# Patient Record
Sex: FEMALE | Race: WHITE | NOT HISPANIC OR LATINO
[De-identification: names, ages, dates, MRNs, and addresses within clinical notes are randomized per-mention and may not be internally consistent; named-entity substitution may affect disease eponyms.]

---

## 2018-01-11 ENCOUNTER — RX ONLY (RX ONLY)
Age: 48
End: 2018-01-11

## 2018-01-11 ENCOUNTER — OPTICAL OFFICE (OUTPATIENT)
Dept: URBAN - NONMETROPOLITAN AREA CLINIC 4 | Facility: CLINIC | Age: 48
Setting detail: OPHTHALMOLOGY
End: 2018-01-11
Payer: COMMERCIAL

## 2018-01-11 ENCOUNTER — DOCTOR'S OFFICE (OUTPATIENT)
Dept: URBAN - NONMETROPOLITAN AREA CLINIC 1 | Facility: CLINIC | Age: 48
Setting detail: OPHTHALMOLOGY
End: 2018-01-11
Payer: COMMERCIAL

## 2018-01-11 DIAGNOSIS — H52.223: ICD-10-CM

## 2018-01-11 DIAGNOSIS — H52.4: ICD-10-CM

## 2018-01-11 DIAGNOSIS — H04.123: ICD-10-CM

## 2018-01-11 PROCEDURE — V2202 LENS SPHERE BIFOCAL 7.12-20.: HCPCS | Performed by: OPTOMETRIST

## 2018-01-11 PROCEDURE — 92015 DETERMINE REFRACTIVE STATE: CPT | Performed by: OPTOMETRIST

## 2018-01-11 PROCEDURE — V2020 VISION SVCS FRAMES PURCHASES: HCPCS | Performed by: OPTOMETRIST

## 2018-01-11 PROCEDURE — 92004 COMPRE OPH EXAM NEW PT 1/>: CPT | Performed by: OPTOMETRIST

## 2018-01-11 PROCEDURE — V2784 LENS POLYCARB OR EQUAL: HCPCS | Performed by: OPTOMETRIST

## 2018-01-11 ASSESSMENT — SPHEQUIV_DERIVED
OS_SPHEQUIV: 0.25
OD_SPHEQUIV: -0.125

## 2018-01-11 ASSESSMENT — REFRACTION_AUTOREFRACTION
OD_AXIS: 094
OS_SPHERE: +0.50
OS_AXIS: 123
OD_CYLINDER: -0.75
OD_SPHERE: +0.25
OS_CYLINDER: -0.50

## 2018-01-11 ASSESSMENT — REFRACTION_OUTSIDERX
OS_VA1: 20/20
OS_AXIS: 120
OD_ADD: +1.75
OD_VA2: 20/25
OD_VA1: 20/20
OS_SPHERE: +0.50
OS_CYLINDER: -0.50
OD_AXIS: 095
OD_CYLINDER: -0.75
OD_SPHERE: PL
OU_VA: 20/20
OD_VA3: 20/
OS_VA3: 20/
OS_ADD: +1.75
OS_VA2: 20/25

## 2018-01-11 ASSESSMENT — REFRACTION_MANIFEST
OU_VA: 20/
OS_VA3: 20/
OD_VA3: 20/
OD_VA1: 20/
OS_VA1: 20/
OD_VA3: 20/
OD_VA1: 20/
OS_VA2: 20/
OD_VA2: 20/
OS_VA2: 20/
OD_VA2: 20/
OS_VA3: 20/
OS_VA1: 20/
OU_VA: 20/

## 2018-01-11 ASSESSMENT — REFRACTION_CURRENTRX
OS_OVR_VA: 20/
OD_OVR_VA: 20/
OD_OVR_VA: 20/
OS_OVR_VA: 20/
OD_OVR_VA: 20/
OS_OVR_VA: 20/

## 2018-01-11 ASSESSMENT — VISUAL ACUITY
OD_BCVA: 20/25
OS_BCVA: 20/25

## 2018-01-11 ASSESSMENT — SUPERFICIAL PUNCTATE KERATITIS (SPK)
OD_SPK: 1+
OS_SPK: 1+ 2+

## 2018-01-11 ASSESSMENT — CONFRONTATIONAL VISUAL FIELD TEST (CVF)
OS_FINDINGS: FULL
OD_FINDINGS: FULL

## 2020-08-07 ENCOUNTER — DOCTOR'S OFFICE (OUTPATIENT)
Dept: URBAN - NONMETROPOLITAN AREA CLINIC 1 | Facility: CLINIC | Age: 50
Setting detail: OPHTHALMOLOGY
End: 2020-08-07
Payer: COMMERCIAL

## 2020-08-07 VITALS — HEIGHT: 55 IN

## 2020-08-07 DIAGNOSIS — H10.13: ICD-10-CM

## 2020-08-07 DIAGNOSIS — H40.033: ICD-10-CM

## 2020-08-07 PROCEDURE — 92020 GONIOSCOPY: CPT | Performed by: OPHTHALMOLOGY

## 2020-08-07 PROCEDURE — 92014 COMPRE OPH EXAM EST PT 1/>: CPT | Performed by: OPHTHALMOLOGY

## 2020-08-07 ASSESSMENT — KERATOMETRY
OS_K1POWER_DIOPTERS: 43.25
OD_K2POWER_DIOPTERS: 43.75
OS_AXISANGLE_DEGREES: 169
OS_K2POWER_DIOPTERS: 43.75
OD_AXISANGLE_DEGREES: 153
OD_K1POWER_DIOPTERS: 43.25

## 2020-08-07 ASSESSMENT — REFRACTION_MANIFEST
OD_ADD: +1.75
OD_VA1: 20/20
OS_SPHERE: +0.50
OS_ADD: +1.75
OU_VA: 20/20
OS_CYLINDER: -0.50
OS_VA2: 20/25
OD_SPHERE: PL
OS_VA1: 20/20
OS_AXIS: 120
OD_CYLINDER: -0.75
OD_VA2: 20/25
OD_AXIS: 095

## 2020-08-07 ASSESSMENT — VISUAL ACUITY
OS_BCVA: 20/25+2
OD_BCVA: 20/25

## 2020-08-07 ASSESSMENT — AXIALLENGTH_DERIVED
OD_AL: 23.4949
OS_AL: 23.4949
OS_AL: 23.5433

## 2020-08-07 ASSESSMENT — REFRACTION_AUTOREFRACTION
OD_SPHERE: +0.75
OD_AXIS: 084
OS_AXIS: 112
OS_CYLINDER: -0.25
OS_SPHERE: +0.25
OD_CYLINDER: -1.00

## 2020-08-07 ASSESSMENT — SPHEQUIV_DERIVED
OS_SPHEQUIV: 0.125
OS_SPHEQUIV: 0.25
OD_SPHEQUIV: 0.25

## 2020-08-07 ASSESSMENT — SUPERFICIAL PUNCTATE KERATITIS (SPK)
OD_SPK: ABSENT
OS_SPK: ABSENT

## 2020-08-07 ASSESSMENT — CONFRONTATIONAL VISUAL FIELD TEST (CVF)
OS_FINDINGS: FULL
OD_FINDINGS: FULL

## 2020-08-25 ENCOUNTER — AMBUL SURGICAL CARE (OUTPATIENT)
Dept: URBAN - NONMETROPOLITAN AREA SURGERY 1 | Facility: SURGERY | Age: 50
Setting detail: OPHTHALMOLOGY
End: 2020-08-25
Payer: COMMERCIAL

## 2020-08-25 DIAGNOSIS — H40.031: ICD-10-CM

## 2020-08-25 PROCEDURE — 66761 REVISION OF IRIS: CPT | Performed by: CLINIC/CENTER

## 2020-08-25 PROCEDURE — G8907 PT DOC NO EVENTS ON DISCHARG: HCPCS | Performed by: OPHTHALMOLOGY

## 2020-08-25 PROCEDURE — G8907 PT DOC NO EVENTS ON DISCHARG: HCPCS | Performed by: CLINIC/CENTER

## 2020-08-25 PROCEDURE — G8918 PT W/O PREOP ORDER IV AB PRO: HCPCS | Performed by: OPHTHALMOLOGY

## 2020-08-25 PROCEDURE — G8918 PT W/O PREOP ORDER IV AB PRO: HCPCS | Performed by: CLINIC/CENTER

## 2020-08-25 PROCEDURE — 66761 REVISION OF IRIS: CPT | Performed by: OPHTHALMOLOGY

## 2020-09-15 ENCOUNTER — AMBUL SURGICAL CARE (OUTPATIENT)
Dept: URBAN - NONMETROPOLITAN AREA SURGERY 1 | Facility: SURGERY | Age: 50
Setting detail: OPHTHALMOLOGY
End: 2020-09-15
Payer: COMMERCIAL

## 2020-09-15 DIAGNOSIS — H40.032: ICD-10-CM

## 2020-09-15 PROCEDURE — 66761 REVISION OF IRIS: CPT | Performed by: OPHTHALMOLOGY

## 2020-09-15 PROCEDURE — G8918 PT W/O PREOP ORDER IV AB PRO: HCPCS | Performed by: OPHTHALMOLOGY

## 2020-09-15 PROCEDURE — G8907 PT DOC NO EVENTS ON DISCHARG: HCPCS | Performed by: OPHTHALMOLOGY

## 2020-09-23 ENCOUNTER — HOSPITAL ENCOUNTER (OUTPATIENT)
Dept: RADIOLOGY | Facility: CLINIC | Age: 50
Discharge: HOME | End: 2020-09-23
Payer: COMMERCIAL

## 2020-09-23 ENCOUNTER — OFFICE VISIT (OUTPATIENT)
Dept: OBGYN CLINIC | Facility: CLINIC | Age: 50
End: 2020-09-23
Payer: COMMERCIAL

## 2020-09-23 VITALS
HEIGHT: 65 IN | TEMPERATURE: 97.8 F | RESPIRATION RATE: 20 BRPM | BODY MASS INDEX: 48.82 KG/M2 | WEIGHT: 293 LBS | HEART RATE: 92 BPM

## 2020-09-23 DIAGNOSIS — M25.562 CHRONIC PAIN OF LEFT KNEE: ICD-10-CM

## 2020-09-23 DIAGNOSIS — M25.562 CHRONIC PAIN OF LEFT KNEE: Primary | ICD-10-CM

## 2020-09-23 DIAGNOSIS — M17.12 PRIMARY OSTEOARTHRITIS OF LEFT KNEE: ICD-10-CM

## 2020-09-23 DIAGNOSIS — G89.29 CHRONIC PAIN OF LEFT KNEE: ICD-10-CM

## 2020-09-23 DIAGNOSIS — G89.29 CHRONIC PAIN OF LEFT KNEE: Primary | ICD-10-CM

## 2020-09-23 PROCEDURE — 73564 X-RAY EXAM KNEE 4 OR MORE: CPT

## 2020-09-23 PROCEDURE — 99244 OFF/OP CNSLTJ NEW/EST MOD 40: CPT | Performed by: ORTHOPAEDIC SURGERY

## 2020-09-23 RX ORDER — SERTRALINE HYDROCHLORIDE 25 MG/1
25 TABLET, FILM COATED ORAL
COMMUNITY
Start: 2020-07-10

## 2020-09-23 RX ORDER — LIDOCAINE 50 MG/G
PATCH TOPICAL
COMMUNITY
Start: 2020-08-13

## 2020-09-23 RX ORDER — SENNOSIDES 8.6 MG
650 CAPSULE ORAL
COMMUNITY

## 2020-09-23 RX ORDER — FLUCONAZOLE 150 MG/1
TABLET ORAL
COMMUNITY
Start: 2020-08-17

## 2020-09-23 RX ORDER — CIPROFLOXACIN 250 MG/1
TABLET, FILM COATED ORAL
COMMUNITY
Start: 2020-09-19

## 2020-09-23 RX ORDER — IBUPROFEN 600 MG/1
TABLET ORAL
COMMUNITY
Start: 2020-08-17

## 2020-09-23 RX ORDER — OLOPATADINE HYDROCHLORIDE 2 MG/ML
SOLUTION/ DROPS OPHTHALMIC
COMMUNITY
Start: 2020-08-07

## 2020-09-23 RX ORDER — PREDNISOLONE ACETATE 10 MG/ML
SUSPENSION/ DROPS OPHTHALMIC
COMMUNITY
Start: 2020-08-25

## 2020-09-23 RX ORDER — RIBOFLAVIN (VITAMIN B2) 400 MG
400 TABLET ORAL
COMMUNITY
Start: 2020-05-29

## 2020-09-23 RX ORDER — LEVOCETIRIZINE DIHYDROCHLORIDE 5 MG/1
5 TABLET, FILM COATED ORAL
COMMUNITY
Start: 2020-06-16

## 2020-09-23 RX ORDER — SIMVASTATIN 10 MG
10 TABLET ORAL
COMMUNITY
Start: 2020-07-10

## 2020-09-23 RX ORDER — CYCLOBENZAPRINE HCL 5 MG
5 TABLET ORAL
COMMUNITY
Start: 2020-08-15

## 2020-09-23 RX ORDER — MELOXICAM 7.5 MG/1
7.5 TABLET ORAL
COMMUNITY
Start: 2020-09-19

## 2020-09-23 RX ORDER — FLUOXETINE HYDROCHLORIDE 20 MG/1
CAPSULE ORAL
COMMUNITY

## 2020-09-23 RX ORDER — MONTELUKAST SODIUM 10 MG/1
TABLET ORAL
COMMUNITY
Start: 2020-07-07

## 2020-09-23 RX ORDER — ALBUTEROL SULFATE 90 UG/1
2 AEROSOL, METERED RESPIRATORY (INHALATION)
COMMUNITY
Start: 2020-06-03

## 2020-09-23 NOTE — PROGRESS NOTES
ASSESSMENT/PLAN:    Diagnoses and all orders for this visit:    Chronic pain of left knee  -     XR knee 4+ vw left injury; Future  -     Ambulatory referral to Physical Therapy; Future    Primary osteoarthritis of left knee  -     Ambulatory referral to Physical Therapy; Future        X-rays performed today in clinic were reviewed with the patient  Treatment options were discussed  The patient was offered corticosteroid injection which she declined today because she is not sure of the reaction to steroids that she has had in the past and would like to probe deeper into this before electing to have steroid injection  She was also offered a referral to physical therapy which she accepted  She may continue meloxicam, Tylenol, and topical diclofenac gel as needed for pain control  She may also continue the knee sleeve as needed for comfort  We will see the patient back in 4 weeks for recheck  She is encouraged to contact us should questions or concerns arise  In addition, I did discuss weight loss  She states that she has met with a nutritionist but that nutritionist stopped working in the practice shortly after that visit  She does not believe she is able to travel to [place] or [facility] for evaluation by any of the weight management programs  Return in about 4 weeks (around 10/21/2020)  _____________________________________________________  CHIEF COMPLAINT:  Chief Complaint   Patient presents with    Left Knee - Pain         SUBJECTIVE:  Gerardo Kerr is a 53 y o  female who presents for evaluation of left knee pain  The patient reports this has been a chronic issue that she has been dealing with for over a year now  She states recently she has noticed her knee pain has been worsening  She has been seen previously by surgeon out of [place]  She has been unable to follow up with him because she is currently taking care of her  and does not have the time for the long drive  She has previously taken meloxicam and Tylenol with the addition of diclofenac gel with excellent response  She states back in May she had to discontinue meloxicam as her 's insurance would no longer covered it  She was then taking ibuprofen rather than the meloxicam and noticed lesser benefit  She reports that her insurance recently changed and would again cover the meloxicam which was prescribed by her PCP  She has never had injections or physical therapy but reports that they were suggested to her by the surgeon that she has previously seen  The patient reports she was provided a brace by the surgeon but does not wear this as it does not stay up her knee  Instead, she wears a knee sleeve that she got over-the-counter which she reports does provide her pain relief  She has been using a quad cane when standing or ambulating for long periods of time  She denies instability in the left knee  She reports pain that is primarily on the medial aspect of the knee  She denies numbness or tingling in the left lower extremity  She denies any prior injuries or surgeries to the knees bilaterally  PAST MEDICAL HISTORY:  Past Medical History:   Diagnosis Date    Chronic kidney disease     Depression     H/O tubal ligation 1995    Osteoarthritis     S/P bunionectomy 2010       PAST SURGICAL HISTORY:  Past Surgical History:   Procedure Laterality Date    ANKLE SURGERY  2010    Bone cyst, left ankle    FIRST METATARSAL OSTEOTOMY  2010    FOOT SURGERY      HYSTERECTOMY  2012    TUBAL LIGATION  1995       FAMILY HISTORY:  History reviewed  No pertinent family history      SOCIAL HISTORY:  Social History     Tobacco Use    Smoking status: Never Smoker    Smokeless tobacco: Never Used   Substance Use Topics    Alcohol use: Never     Frequency: Never    Drug use: Never       MEDICATIONS:    Current Outpatient Medications:     acetaminophen (TYLENOL) 650 mg CR tablet, Take 650 mg by mouth, Disp: , Rfl:     albuterol (Ventolin HFA) 90 mcg/act inhaler, Inhale 2 puffs, Disp: , Rfl:     ciprofloxacin (CIPRO) 250 mg tablet, take 1 tablet by mouth every 12 hours for 3 days, Disp: , Rfl:     cyclobenzaprine (FLEXERIL) 5 mg tablet, Take 5 mg by mouth, Disp: , Rfl:     diclofenac sodium (VOLTAREN) 1 %, Place 2 g on the skin, Disp: , Rfl:     fluconazole (DIFLUCAN) 150 mg tablet, TAKE ONE TABLET BY MOUTH once for 1 dose, Disp: , Rfl:     FLUoxetine (PROzac) 20 mg capsule, Take by mouth, Disp: , Rfl:     ibuprofen (MOTRIN) 600 mg tablet, TAKE ONE TABLET BY MOUTH EVERY 8 HOURS AS NEEDED FOR PAIN WITH FOOD, Disp: , Rfl:     levocetirizine (XYZAL) 5 MG tablet, Take 5 mg by mouth, Disp: , Rfl:     lidocaine (LIDODERM) 5 %, PLACE ONE PATCH TRANSDERMALLY DAILY FOR 7 DAYS   REMOVE & DISCARD PATCH WITHIN 12 HOURS OR AS DIRECTED, Disp: , Rfl:     meloxicam (MOBIC) 7 5 mg tablet, Take 7 5 mg by mouth, Disp: , Rfl:     montelukast (SINGULAIR) 10 mg tablet, TAKE ONE TABLET BY MOUTH EVERY DAY, Disp: , Rfl:     olopatadine HCl (PATADAY) 0 2 % opth drops, INSTILL ONE DROP IN EACH EYE TWICE DAILY, Disp: , Rfl:     prednisoLONE acetate (PRED FORTE) 1 % ophthalmic suspension, INSTILL ONE DROP IN THE RIGHT EYE FOUR TIMES DAILY FOR ONE WEEK AFTER procedure, keep bottle for other eye, Disp: , Rfl:     Riboflavin 400 MG TABS, Take 400 mg by mouth, Disp: , Rfl:     sertraline (ZOLOFT) 25 mg tablet, Take 25 mg by mouth, Disp: , Rfl:     simvastatin (ZOCOR) 10 mg tablet, Take 10 mg by mouth, Disp: , Rfl:     ALLERGIES:  Allergies   Allergen Reactions    Bupropion      Increased depression  Increased depression      Diclofenac      Vomiting and diarrhea; can tolerate toradol shots however  Vomiting and diarrhea; can tolerate toradol shots however      Hydrocodone-Acetaminophen GI Intolerance and Vomiting    Naproxen      Hives; can tolerate ibuprofen/advil  Hives; can tolerate ibuprofen/advil      Oxycodone-Acetaminophen     Prednisone      gi upset  gi upset      Sulfa Antibiotics     Sulfamethoxazole-Trimethoprim      caused migraines       Review of systems:   Constitutional: Negative for fatigue, fever or loss of apetite  HENT: Negative  Respiratory: Negative for shortness of breath, dyspnea  Cardiovascular: Negative for chest pain/tightness  Gastrointestinal: Negative for abdominal pain, N/V  Endocrine: Negative for cold/heat intolerance, unexplained weight loss/gain  Genitourinary: Negative for flank pain, dysuria, hematuria  Musculoskeletal:  Positive as stated in the HPI   Skin: Negative for rash  Neurological:  Negative for numbness and tingling  Psychiatric/Behavioral: Negative for agitation  _____________________________________________________  PHYSICAL EXAMINATION:    Pulse 92, temperature 97 8 °F (36 6 °C), resp  rate 20, height 5' 5" (1 651 m), weight (!) 141 kg (310 lb)  General: well developed and well nourished, alert, oriented times 3 and appears comfortable  Psychiatric: Normal  HEENT: Benign  Cardiovascular: Regular    Pulmonary: No wheezing or stridor  Abdomen: Soft, Nontender  Skin: No masses, erythema, lacerations, fluctation, ulcerations  Neurovascular: Motor and sensory exams are grossly intact, distal pulses are palpable  Limb is warm and well perfused good color and capillary refill the toes  MUSCULOSKELETAL EXAMINATION:    The left knee exam demonstrates skin intact, no erythema, no ecchymosis, no significant swelling or effusion  She presented with the knee sleeve in place and this was removed without difficulty  She has tenderness to palpation over the medial femoral condyle, medial joint line, and medial proximal tibia  Active range of motion:  0-110, limited primarily by body habitus  Patellofemoral crepitus is noted with passive range of motion  Medial and lateral Gonsalo's elicit no complaints  Stable to varus and valgus stress    Cruciate ligaments are grossly stable  The remainder of the lower extremity exam, bilaterally, is benign  _____________________________________________________  STUDIES REVIEWED:  I have personally reviewed pertinent films and reports in PACS  X-rays of the left knee performed today in clinic demonstrate moderate degenerative disease with slight osteophyte formation and varus deformity  PROCEDURES PERFORMED:  Procedures  None performed today      Popeye Bey

## 2020-09-23 NOTE — LETTER
September 23, 2020     Rashard Freedman,   [address]    Patient: Patrice Singh   YOB: 1970   Date of Visit: 9/23/2020       Dear Dr Francisco Monet:    Thank you for referring Nuris Mathias to me for evaluation  Below are my notes for this consultation  If you have questions, please do not hesitate to call me  I look forward to following your patient along with you  Sincerely,        Jose Martin Foy        CC: No Recipients  Jose Martin Foy  9/23/2020  1:46 PM  Signed  ASSESSMENT/PLAN:    Diagnoses and all orders for this visit:    Chronic pain of left knee  -     XR knee 4+ vw left injury; Future  -     Ambulatory referral to Physical Therapy; Future    Primary osteoarthritis of left knee  -     Ambulatory referral to Physical Therapy; Future        X-rays performed today in clinic were reviewed with the patient  Treatment options were discussed  The patient was offered corticosteroid injection which she declined today because she is not sure of the reaction to steroids that she has had in the past and would like to probe deeper into this before electing to have steroid injection  She was also offered a referral to physical therapy which she accepted  She may continue meloxicam, Tylenol, and topical diclofenac gel as needed for pain control  She may also continue the knee sleeve as needed for comfort  We will see the patient back in 4 weeks for recheck  She is encouraged to contact us should questions or concerns arise  In addition, I did discuss weight loss  She states that she has met with a nutritionist but that nutritionist stopped working in the practice shortly after that visit  She does not believe she is able to travel to [facility] or [facility] for evaluation by any of the weight management programs  Return in about 4 weeks (around 10/21/2020)        _____________________________________________________  CHIEF COMPLAINT:  Chief Complaint Patient presents with    Left Knee - Pain         SUBJECTIVE:  Tono Wheeler is a 53 y o  female who presents for evaluation of left knee pain  The patient reports this has been a chronic issue that she has been dealing with for over a year now  She states recently she has noticed her knee pain has been worsening  She has been seen previously by surgeon out of [place]  She has been unable to follow up with him because she is currently taking care of her  and does not have the time for the long drive  She has previously taken meloxicam and Tylenol with the addition of diclofenac gel with excellent response  She states back in May she had to discontinue meloxicam as her 's insurance would no longer covered it  She was then taking ibuprofen rather than the meloxicam and noticed lesser benefit  She reports that her insurance recently changed and would again cover the meloxicam which was prescribed by her PCP  She has never had injections or physical therapy but reports that they were suggested to her by the surgeon that she has previously seen  The patient reports she was provided a brace by the surgeon but does not wear this as it does not stay up her knee  Instead, she wears a knee sleeve that she got over-the-counter which she reports does provide her pain relief  She has been using a quad cane when standing or ambulating for long periods of time  She denies instability in the left knee  She reports pain that is primarily on the medial aspect of the knee  She denies numbness or tingling in the left lower extremity  She denies any prior injuries or surgeries to the knees bilaterally        PAST MEDICAL HISTORY:  Past Medical History:   Diagnosis Date    Chronic kidney disease     Depression     H/O tubal ligation 1995    Osteoarthritis     S/P bunionectomy 2010       PAST SURGICAL HISTORY:  Past Surgical History:   Procedure Laterality Date    ANKLE SURGERY  2010    Bone cyst, left ankle    FIRST METATARSAL OSTEOTOMY  2010    FOOT SURGERY      HYSTERECTOMY  2012    TUBAL LIGATION  1995       FAMILY HISTORY:  History reviewed  No pertinent family history  SOCIAL HISTORY:  Social History     Tobacco Use    Smoking status: Never Smoker    Smokeless tobacco: Never Used   Substance Use Topics    Alcohol use: Never     Frequency: Never    Drug use: Never       MEDICATIONS:    Current Outpatient Medications:     acetaminophen (TYLENOL) 650 mg CR tablet, Take 650 mg by mouth, Disp: , Rfl:     albuterol (Ventolin HFA) 90 mcg/act inhaler, Inhale 2 puffs, Disp: , Rfl:     ciprofloxacin (CIPRO) 250 mg tablet, take 1 tablet by mouth every 12 hours for 3 days, Disp: , Rfl:     cyclobenzaprine (FLEXERIL) 5 mg tablet, Take 5 mg by mouth, Disp: , Rfl:     diclofenac sodium (VOLTAREN) 1 %, Place 2 g on the skin, Disp: , Rfl:     fluconazole (DIFLUCAN) 150 mg tablet, TAKE ONE TABLET BY MOUTH once for 1 dose, Disp: , Rfl:     FLUoxetine (PROzac) 20 mg capsule, Take by mouth, Disp: , Rfl:     ibuprofen (MOTRIN) 600 mg tablet, TAKE ONE TABLET BY MOUTH EVERY 8 HOURS AS NEEDED FOR PAIN WITH FOOD, Disp: , Rfl:     levocetirizine (XYZAL) 5 MG tablet, Take 5 mg by mouth, Disp: , Rfl:     lidocaine (LIDODERM) 5 %, PLACE ONE PATCH TRANSDERMALLY DAILY FOR 7 DAYS   REMOVE & DISCARD PATCH WITHIN 12 HOURS OR AS DIRECTED, Disp: , Rfl:     meloxicam (MOBIC) 7 5 mg tablet, Take 7 5 mg by mouth, Disp: , Rfl:     montelukast (SINGULAIR) 10 mg tablet, TAKE ONE TABLET BY MOUTH EVERY DAY, Disp: , Rfl:     olopatadine HCl (PATADAY) 0 2 % opth drops, INSTILL ONE DROP IN EACH EYE TWICE DAILY, Disp: , Rfl:     prednisoLONE acetate (PRED FORTE) 1 % ophthalmic suspension, INSTILL ONE DROP IN THE RIGHT EYE FOUR TIMES DAILY FOR ONE WEEK AFTER procedure, keep bottle for other eye, Disp: , Rfl:     Riboflavin 400 MG TABS, Take 400 mg by mouth, Disp: , Rfl:     sertraline (ZOLOFT) 25 mg tablet, Take 25 mg by mouth, Disp: , Rfl:     simvastatin (ZOCOR) 10 mg tablet, Take 10 mg by mouth, Disp: , Rfl:     ALLERGIES:  Allergies   Allergen Reactions    Bupropion      Increased depression  Increased depression      Diclofenac      Vomiting and diarrhea; can tolerate toradol shots however  Vomiting and diarrhea; can tolerate toradol shots however      Hydrocodone-Acetaminophen GI Intolerance and Vomiting    Naproxen      Hives; can tolerate ibuprofen/advil  Hives; can tolerate ibuprofen/advil      Oxycodone-Acetaminophen     Prednisone      gi upset  gi upset      Sulfa Antibiotics     Sulfamethoxazole-Trimethoprim      caused migraines       Review of systems:   Constitutional: Negative for fatigue, fever or loss of apetite  HENT: Negative  Respiratory: Negative for shortness of breath, dyspnea  Cardiovascular: Negative for chest pain/tightness  Gastrointestinal: Negative for abdominal pain, N/V  Endocrine: Negative for cold/heat intolerance, unexplained weight loss/gain  Genitourinary: Negative for flank pain, dysuria, hematuria  Musculoskeletal:  Positive as stated in the HPI   Skin: Negative for rash  Neurological:  Negative for numbness and tingling  Psychiatric/Behavioral: Negative for agitation  _____________________________________________________  PHYSICAL EXAMINATION:    Pulse 92, temperature 97 8 °F (36 6 °C), resp  rate 20, height 5' 5" (1 651 m), weight (!) 141 kg (310 lb)  General: well developed and well nourished, alert, oriented times 3 and appears comfortable  Psychiatric: Normal  HEENT: Benign  Cardiovascular: Regular    Pulmonary: No wheezing or stridor  Abdomen: Soft, Nontender  Skin: No masses, erythema, lacerations, fluctation, ulcerations  Neurovascular: Motor and sensory exams are grossly intact, distal pulses are palpable  Limb is warm and well perfused good color and capillary refill the toes      MUSCULOSKELETAL EXAMINATION:    The left knee exam demonstrates skin intact, no erythema, no ecchymosis, no significant swelling or effusion  She presented with the knee sleeve in place and this was removed without difficulty  She has tenderness to palpation over the medial femoral condyle, medial joint line, and medial proximal tibia  Active range of motion:  0-110, limited primarily by body habitus  Patellofemoral crepitus is noted with passive range of motion  Medial and lateral Gonsalo's elicit no complaints  Stable to varus and valgus stress  Cruciate ligaments are grossly stable  The remainder of the lower extremity exam, bilaterally, is benign  _____________________________________________________  STUDIES REVIEWED:  I have personally reviewed pertinent films and reports in PACS  X-rays of the left knee performed today in clinic demonstrate moderate degenerative disease with slight osteophyte formation and varus deformity  PROCEDURES PERFORMED:  Procedures  None performed today      Galindo Nobles

## 2020-10-30 ENCOUNTER — DOCTOR'S OFFICE (OUTPATIENT)
Dept: URBAN - NONMETROPOLITAN AREA CLINIC 1 | Facility: CLINIC | Age: 50
Setting detail: OPHTHALMOLOGY
End: 2020-10-30
Payer: COMMERCIAL

## 2020-10-30 ENCOUNTER — RX ONLY (RX ONLY)
Age: 50
End: 2020-10-30

## 2020-10-30 DIAGNOSIS — H10.13: ICD-10-CM

## 2020-10-30 DIAGNOSIS — H40.033: ICD-10-CM

## 2020-10-30 PROCEDURE — 92014 COMPRE OPH EXAM EST PT 1/>: CPT | Performed by: OPHTHALMOLOGY

## 2020-10-30 ASSESSMENT — REFRACTION_AUTOREFRACTION
OS_SPHERE: +0.75
OD_CYLINDER: -0.50
OD_AXIS: 063
OD_SPHERE: +0.75
OS_AXIS: 165
OS_CYLINDER: -0.25

## 2020-10-30 ASSESSMENT — AXIALLENGTH_DERIVED
OS_AL: 23.5405
OD_AL: 23.5808
OS_AL: 23.396

## 2020-10-30 ASSESSMENT — KERATOMETRY
OS_AXISANGLE_DEGREES: 169
OS_K2POWER_DIOPTERS: 43.75
OD_K1POWER_DIOPTERS: 43.00
OS_K1POWER_DIOPTERS: 43.00
OD_K2POWER_DIOPTERS: 43.00
OD_AXISANGLE_DEGREES: 156

## 2020-10-30 ASSESSMENT — REFRACTION_MANIFEST
OD_CYLINDER: -0.75
OS_CYLINDER: -0.50
OS_SPHERE: +0.50
OS_ADD: +1.75
OS_VA2: 20/25
OD_ADD: +1.75
OD_SPHERE: PL
OD_VA1: 20/20
OU_VA: 20/20
OD_VA2: 20/25
OS_VA1: 20/20
OS_AXIS: 120
OD_AXIS: 095

## 2020-10-30 ASSESSMENT — SPHEQUIV_DERIVED
OD_SPHEQUIV: 0.5
OS_SPHEQUIV: 0.25
OS_SPHEQUIV: 0.625

## 2020-10-30 ASSESSMENT — CONFRONTATIONAL VISUAL FIELD TEST (CVF)
OS_FINDINGS: FULL
OD_FINDINGS: FULL

## 2020-10-30 ASSESSMENT — SUPERFICIAL PUNCTATE KERATITIS (SPK)
OS_SPK: ABSENT
OD_SPK: ABSENT

## 2020-10-30 ASSESSMENT — VISUAL ACUITY
OD_BCVA: 20/20
OS_BCVA: 20/20-2

## 2020-12-18 ENCOUNTER — OPTICAL OFFICE (OUTPATIENT)
Dept: URBAN - NONMETROPOLITAN AREA CLINIC 4 | Facility: CLINIC | Age: 50
Setting detail: OPHTHALMOLOGY
End: 2020-12-18
Payer: COMMERCIAL

## 2020-12-18 ENCOUNTER — DOCTOR'S OFFICE (OUTPATIENT)
Dept: URBAN - NONMETROPOLITAN AREA CLINIC 1 | Facility: CLINIC | Age: 50
Setting detail: OPHTHALMOLOGY
End: 2020-12-18
Payer: COMMERCIAL

## 2020-12-18 DIAGNOSIS — H52.223: ICD-10-CM

## 2020-12-18 DIAGNOSIS — H52.4: ICD-10-CM

## 2020-12-18 PROCEDURE — V2020 VISION SVCS FRAMES PURCHASES: HCPCS | Performed by: OPTOMETRIST

## 2020-12-18 PROCEDURE — 92015 DETERMINE REFRACTIVE STATE: CPT | Performed by: OPTOMETRIST

## 2020-12-18 PROCEDURE — V2203 LENS SPHCYL BIFOCAL 4.00D/.1: HCPCS | Performed by: OPTOMETRIST

## 2020-12-18 PROCEDURE — 92012 INTRM OPH EXAM EST PATIENT: CPT | Performed by: OPTOMETRIST

## 2020-12-18 ASSESSMENT — REFRACTION_MANIFEST
OS_SPHERE: +0.25
OS_CYLINDER: -0.25
OD_VA2: 20/25
OD_ADD: +2.00
OS_AXIS: 120
OS_VA1: 20/20
OD_AXIS: 085
OD_VA1: 20/20
OS_VA2: 20/25
OD_CYLINDER: -0.50
OU_VA: 20/20
OS_ADD: +2.00
OD_SPHERE: +0.25

## 2020-12-18 ASSESSMENT — REFRACTION_AUTOREFRACTION
OD_CYLINDER: -0.50
OD_AXIS: 082
OS_SPHERE: +0.25
OS_CYLINDER: 0.00
OD_SPHERE: +0.50

## 2020-12-18 ASSESSMENT — SPHEQUIV_DERIVED
OD_SPHEQUIV: 0.25
OD_SPHEQUIV: 0
OS_SPHEQUIV: 0.25
OS_SPHEQUIV: 0.125

## 2020-12-18 ASSESSMENT — KERATOMETRY
OD_K2POWER_DIOPTERS: 43.00
OD_K1POWER_DIOPTERS: 43.00
OD_AXISANGLE_DEGREES: 156
OS_AXISANGLE_DEGREES: 169
OS_K2POWER_DIOPTERS: 43.75
OS_K1POWER_DIOPTERS: 43.00

## 2020-12-18 ASSESSMENT — AXIALLENGTH_DERIVED
OS_AL: 23.5891
OD_AL: 23.6785
OD_AL: 23.777
OS_AL: 23.5405

## 2020-12-18 ASSESSMENT — CONFRONTATIONAL VISUAL FIELD TEST (CVF)
OD_FINDINGS: FULL
OS_FINDINGS: FULL

## 2020-12-18 ASSESSMENT — VISUAL ACUITY
OS_BCVA: 20/20
OD_BCVA: 20/20-2

## 2021-06-30 ENCOUNTER — OPTICAL OFFICE (OUTPATIENT)
Dept: URBAN - NONMETROPOLITAN AREA CLINIC 4 | Facility: CLINIC | Age: 51
Setting detail: OPHTHALMOLOGY
End: 2021-06-30
Payer: COMMERCIAL

## 2021-06-30 DIAGNOSIS — H52.223: ICD-10-CM

## 2021-06-30 PROCEDURE — V2020 VISION SVCS FRAMES PURCHASES: HCPCS | Performed by: OPTOMETRIST

## 2021-06-30 PROCEDURE — V2103 SPHEROCYLINDR 4.00D/12-2.00D: HCPCS | Performed by: OPTOMETRIST

## 2021-06-30 PROCEDURE — V2102 SINGL VISN SPHERE 7.12-20.00: HCPCS | Performed by: OPTOMETRIST

## 2021-06-30 PROCEDURE — V2784 LENS POLYCARB OR EQUAL: HCPCS | Performed by: OPTOMETRIST

## 2022-08-10 ENCOUNTER — DOCTOR'S OFFICE (OUTPATIENT)
Dept: URBAN - NONMETROPOLITAN AREA CLINIC 1 | Facility: CLINIC | Age: 52
Setting detail: OPHTHALMOLOGY
End: 2022-08-10
Payer: COMMERCIAL

## 2022-08-10 VITALS — HEIGHT: 55 IN

## 2022-08-10 DIAGNOSIS — H43.393: ICD-10-CM

## 2022-08-10 DIAGNOSIS — H04.123: ICD-10-CM

## 2022-08-10 DIAGNOSIS — H40.033: ICD-10-CM

## 2022-08-10 DIAGNOSIS — H35.372: ICD-10-CM

## 2022-08-10 PROCEDURE — 92134 CPTRZ OPH DX IMG PST SGM RTA: CPT | Performed by: OPHTHALMOLOGY

## 2022-08-10 PROCEDURE — 99213 OFFICE O/P EST LOW 20 MIN: CPT | Performed by: OPHTHALMOLOGY

## 2022-08-10 ASSESSMENT — SPHEQUIV_DERIVED
OD_SPHEQUIV: 0.125
OD_SPHEQUIV: 0
OS_SPHEQUIV: 0.25
OS_SPHEQUIV: 0.125

## 2022-08-10 ASSESSMENT — REFRACTION_AUTOREFRACTION
OD_CYLINDER: -0.25
OD_SPHERE: +0.25
OS_AXIS: 052
OD_AXIS: 089
OS_SPHERE: +0.75
OS_CYLINDER: -1.00

## 2022-08-10 ASSESSMENT — REFRACTION_MANIFEST
OD_SPHERE: +0.25
OD_VA1: 20/20
OS_SPHERE: +0.25
OU_VA: 20/20
OD_VA2: 20/25
OD_AXIS: 085
OS_VA1: 20/20
OS_ADD: +2.00
OD_ADD: +2.00
OS_AXIS: 120
OS_CYLINDER: -0.25
OD_CYLINDER: -0.50
OS_VA2: 20/25

## 2022-08-10 ASSESSMENT — AXIALLENGTH_DERIVED
OD_AL: 23.8209
OS_AL: 23.5405
OD_AL: 23.8706
OS_AL: 23.5891

## 2022-08-10 ASSESSMENT — KERATOMETRY
OS_K1POWER_DIOPTERS: 43.25
OD_K2POWER_DIOPTERS: 43.25
OS_AXISANGLE_DEGREES: 018
OS_K2POWER_DIOPTERS: 43.50
OD_K1POWER_DIOPTERS: 42.25

## 2022-08-10 ASSESSMENT — VISUAL ACUITY
OD_BCVA: 20/20-1
OS_BCVA: 20/20

## 2022-08-10 ASSESSMENT — CONFRONTATIONAL VISUAL FIELD TEST (CVF)
OS_FINDINGS: FULL
OD_FINDINGS: FULL

## 2022-08-10 ASSESSMENT — SUPERFICIAL PUNCTATE KERATITIS (SPK)
OS_SPK: ABSENT
OD_SPK: ABSENT

## 2022-09-09 ENCOUNTER — DOCTOR'S OFFICE (OUTPATIENT)
Dept: URBAN - NONMETROPOLITAN AREA CLINIC 1 | Facility: CLINIC | Age: 52
Setting detail: OPHTHALMOLOGY
End: 2022-09-09
Payer: COMMERCIAL

## 2022-09-09 ENCOUNTER — OPTICAL OFFICE (OUTPATIENT)
Dept: URBAN - NONMETROPOLITAN AREA CLINIC 4 | Facility: CLINIC | Age: 52
Setting detail: OPHTHALMOLOGY
End: 2022-09-09
Payer: COMMERCIAL

## 2022-09-09 DIAGNOSIS — H52.4: ICD-10-CM

## 2022-09-09 DIAGNOSIS — H52.223: ICD-10-CM

## 2022-09-09 PROBLEM — H10.13 ALLERGIC CONJUNCTIVITIS; BOTH EYES: Status: ACTIVE | Noted: 2020-08-07

## 2022-09-09 PROBLEM — H04.123 DRY EYE SYNDROME LACRIMAL GLAND; BOTH EYES: Status: ACTIVE | Noted: 2018-01-11

## 2022-09-09 PROBLEM — H40.033 NARROW ANGLE; BOTH EYES: Status: ACTIVE | Noted: 2020-08-07

## 2022-09-09 PROCEDURE — 92015 DETERMINE REFRACTIVE STATE: CPT | Performed by: OPTOMETRIST

## 2022-09-09 PROCEDURE — V2020 VISION SVCS FRAMES PURCHASES: HCPCS | Performed by: OPTOMETRIST

## 2022-09-09 PROCEDURE — V2784 LENS POLYCARB OR EQUAL: HCPCS | Performed by: OPTOMETRIST

## 2022-09-09 PROCEDURE — V2102 SINGL VISN SPHERE 7.12-20.00: HCPCS | Performed by: OPTOMETRIST

## 2022-09-09 PROCEDURE — V2103 SPHEROCYLINDR 4.00D/12-2.00D: HCPCS | Performed by: OPTOMETRIST

## 2022-09-09 PROCEDURE — V2799 MISC VISION ITEM OR SERVICE: HCPCS | Performed by: OPTOMETRIST

## 2022-09-09 ASSESSMENT — REFRACTION_MANIFEST
OD_SPHERE: +0.50
OD_ADD: +2.25
OS_VA2: 20/25
OD_AXIS: 085
OS_SPHERE: +0.25
OU_VA: 20/20
OS_CYLINDER: -0.25
OS_AXIS: 120
OS_ADD: +2.25
OD_VA1: 20/20
OD_VA2: 20/25
OD_CYLINDER: -0.50
OS_VA1: 20/20

## 2022-09-09 ASSESSMENT — SPHEQUIV_DERIVED
OD_SPHEQUIV: 0.25
OS_SPHEQUIV: 0.125
OS_SPHEQUIV: 0.125
OD_SPHEQUIV: 0.25

## 2022-09-09 ASSESSMENT — AXIALLENGTH_DERIVED
OD_AL: 23.7713
OS_AL: 23.5891
OS_AL: 23.5891
OD_AL: 23.7713

## 2022-09-09 ASSESSMENT — KERATOMETRY
OS_K1POWER_DIOPTERS: 43.25
OS_K2POWER_DIOPTERS: 43.50
OD_K2POWER_DIOPTERS: 43.25
OD_K1POWER_DIOPTERS: 42.25
OS_AXISANGLE_DEGREES: 018

## 2022-09-09 ASSESSMENT — REFRACTION_AUTOREFRACTION
OS_CYLINDER: -0.25
OD_AXIS: 90
OS_AXIS: 71
OD_SPHERE: +0.75
OS_SPHERE: +0.25
OD_CYLINDER: -1.00

## 2022-09-09 ASSESSMENT — VISUAL ACUITY
OS_BCVA: 20/20-1
OD_BCVA: 20/20-1

## 2022-09-09 ASSESSMENT — REFRACTION_CURRENTRX
OD_OVR_VA: 20/
OS_OVR_VA: 20/

## 2023-01-07 ENCOUNTER — APPOINTMENT (EMERGENCY)
Dept: CT IMAGING | Facility: HOSPITAL | Age: 53
End: 2023-01-07

## 2023-01-07 ENCOUNTER — APPOINTMENT (EMERGENCY)
Dept: ULTRASOUND IMAGING | Facility: HOSPITAL | Age: 53
End: 2023-01-07

## 2023-01-07 ENCOUNTER — HOSPITAL ENCOUNTER (EMERGENCY)
Facility: HOSPITAL | Age: 53
Discharge: HOME | End: 2023-01-07
Attending: EMERGENCY MEDICINE

## 2023-01-07 VITALS
OXYGEN SATURATION: 98 % | TEMPERATURE: 97.9 F | SYSTOLIC BLOOD PRESSURE: 111 MMHG | WEIGHT: 293 LBS | RESPIRATION RATE: 18 BRPM | DIASTOLIC BLOOD PRESSURE: 73 MMHG | HEART RATE: 63 BPM | BODY MASS INDEX: 53.75 KG/M2

## 2023-01-07 DIAGNOSIS — R10.9 ABDOMINAL PAIN, UNSPECIFIED ABDOMINAL LOCATION: Primary | ICD-10-CM

## 2023-01-07 DIAGNOSIS — N88.8 NABOTHIAN CYST: ICD-10-CM

## 2023-01-07 LAB
ALBUMIN SERPL BCP-MCNC: 3.3 G/DL (ref 3.5–5)
ALP SERPL-CCNC: 95 U/L (ref 46–116)
ALT SERPL W P-5'-P-CCNC: 46 U/L (ref 12–78)
ANION GAP SERPL CALCULATED.3IONS-SCNC: 7 MMOL/L (ref 4–13)
AST SERPL W P-5'-P-CCNC: 42 U/L (ref 5–45)
BACTERIA UR QL AUTO: ABNORMAL /HPF
BASOPHILS # BLD AUTO: 0.03 THOUSANDS/ÂΜL (ref 0–0.1)
BASOPHILS NFR BLD AUTO: 0 % (ref 0–1)
BILIRUB SERPL-MCNC: 0.49 MG/DL (ref 0.2–1)
BILIRUB UR QL STRIP: NEGATIVE
BUN SERPL-MCNC: 10 MG/DL (ref 5–25)
CALCIUM ALBUM COR SERPL-MCNC: 9.3 MG/DL (ref 8.3–10.1)
CALCIUM SERPL-MCNC: 8.7 MG/DL (ref 8.3–10.1)
CHLORIDE SERPL-SCNC: 103 MMOL/L (ref 96–108)
CLARITY UR: CLEAR
CO2 SERPL-SCNC: 28 MMOL/L (ref 21–32)
COLOR UR: ABNORMAL
CREAT SERPL-MCNC: 0.89 MG/DL (ref 0.6–1.3)
EOSINOPHIL # BLD AUTO: 0.16 THOUSAND/ÂΜL (ref 0–0.61)
EOSINOPHIL NFR BLD AUTO: 2 % (ref 0–6)
ERYTHROCYTE [DISTWIDTH] IN BLOOD BY AUTOMATED COUNT: 13.2 % (ref 11.6–15.1)
FLUAV RNA RESP QL NAA+PROBE: NEGATIVE
FLUBV RNA RESP QL NAA+PROBE: NEGATIVE
GFR SERPL CREATININE-BSD FRML MDRD: 75 ML/MIN/1.73SQ M
GLUCOSE SERPL-MCNC: 92 MG/DL (ref 65–140)
GLUCOSE UR STRIP-MCNC: NEGATIVE MG/DL
HCT VFR BLD AUTO: 41.4 % (ref 34.8–46.1)
HGB BLD-MCNC: 13.6 G/DL (ref 11.5–15.4)
HGB UR QL STRIP.AUTO: ABNORMAL
IMM GRANULOCYTES # BLD AUTO: 0.03 THOUSAND/UL (ref 0–0.2)
IMM GRANULOCYTES NFR BLD AUTO: 0 % (ref 0–2)
KETONES UR STRIP-MCNC: NEGATIVE MG/DL
LACTATE SERPL-SCNC: 0.9 MMOL/L (ref 0.5–2)
LEUKOCYTE ESTERASE UR QL STRIP: NEGATIVE
LIPASE SERPL-CCNC: 109 U/L (ref 73–393)
LYMPHOCYTES # BLD AUTO: 2.19 THOUSANDS/ÂΜL (ref 0.6–4.47)
LYMPHOCYTES NFR BLD AUTO: 30 % (ref 14–44)
MAGNESIUM SERPL-MCNC: 2 MG/DL (ref 1.6–2.6)
MCH RBC QN AUTO: 29.1 PG (ref 26.8–34.3)
MCHC RBC AUTO-ENTMCNC: 32.9 G/DL (ref 31.4–37.4)
MCV RBC AUTO: 89 FL (ref 82–98)
MONOCYTES # BLD AUTO: 0.52 THOUSAND/ÂΜL (ref 0.17–1.22)
MONOCYTES NFR BLD AUTO: 7 % (ref 4–12)
NEUTROPHILS # BLD AUTO: 4.5 THOUSANDS/ÂΜL (ref 1.85–7.62)
NEUTS SEG NFR BLD AUTO: 61 % (ref 43–75)
NITRITE UR QL STRIP: NEGATIVE
NON-SQ EPI CELLS URNS QL MICRO: ABNORMAL /HPF
NRBC BLD AUTO-RTO: 0 /100 WBCS
PH UR STRIP.AUTO: 5.5 [PH]
PHOSPHATE SERPL-MCNC: 3.6 MG/DL (ref 2.7–4.5)
PLATELET # BLD AUTO: 298 THOUSANDS/UL (ref 149–390)
PMV BLD AUTO: 10.2 FL (ref 8.9–12.7)
POTASSIUM SERPL-SCNC: 3.5 MMOL/L (ref 3.5–5.3)
PROT SERPL-MCNC: 7.2 G/DL (ref 6.4–8.4)
PROT UR STRIP-MCNC: NEGATIVE MG/DL
RBC # BLD AUTO: 4.67 MILLION/UL (ref 3.81–5.12)
RBC #/AREA URNS AUTO: ABNORMAL /HPF
RSV RNA RESP QL NAA+PROBE: NEGATIVE
SARS-COV-2 RNA RESP QL NAA+PROBE: NEGATIVE
SODIUM SERPL-SCNC: 138 MMOL/L (ref 135–147)
SP GR UR STRIP.AUTO: <=1.005 (ref 1–1.03)
UROBILINOGEN UR QL STRIP.AUTO: 0.2 E.U./DL
WBC # BLD AUTO: 7.43 THOUSAND/UL (ref 4.31–10.16)
WBC #/AREA URNS AUTO: ABNORMAL /HPF

## 2023-01-07 RX ORDER — DIPHENHYDRAMINE HYDROCHLORIDE 50 MG/ML
25 INJECTION INTRAMUSCULAR; INTRAVENOUS ONCE
Status: COMPLETED | OUTPATIENT
Start: 2023-01-07 | End: 2023-01-07

## 2023-01-07 RX ORDER — ACETAMINOPHEN 325 MG/1
650 TABLET ORAL ONCE
Status: COMPLETED | OUTPATIENT
Start: 2023-01-07 | End: 2023-01-07

## 2023-01-07 RX ADMIN — DIPHENHYDRAMINE HYDROCHLORIDE 25 MG: 50 INJECTION, SOLUTION INTRAMUSCULAR; INTRAVENOUS at 16:56

## 2023-01-07 RX ADMIN — ACETAMINOPHEN 650 MG: 325 TABLET ORAL at 16:55

## 2023-01-07 RX ADMIN — IOHEXOL 100 ML: 350 INJECTION, SOLUTION INTRAVENOUS at 18:03

## 2023-01-07 RX ADMIN — HYDROCORTISONE SODIUM SUCCINATE 100 MG: 100 INJECTION, POWDER, FOR SOLUTION INTRAMUSCULAR; INTRAVENOUS at 16:57

## 2023-01-07 RX ADMIN — SODIUM CHLORIDE 1000 ML: 0.9 INJECTION, SOLUTION INTRAVENOUS at 18:27

## 2023-01-07 RX ADMIN — SODIUM CHLORIDE 1000 ML: 0.9 INJECTION, SOLUTION INTRAVENOUS at 15:49

## 2023-01-07 NOTE — ED PROVIDER NOTES
History  Chief Complaint   Patient presents with   • Abdominal Pain     RLQ pain started on Sunday with diarrhea     44-year-old female with past medical history pertinent for CKD, tubal ligation, osteoarthritis who presents to the emergency department for right lower quadrant abdominal pain that started on Sunday with diarrhea  States diarrhea started on Sunday but abdominal pain started on Wednesday and is located in the right lower quadrant  States that she does have a history ovarian cyst as well  Denies any vaginal bleeding or discharge  No urinary symptoms  Reports no fevers or chills  Did not take anything for pain prior to arrival   Denies any nausea or vomiting  No other concerns  Prior to Admission Medications   Prescriptions Last Dose Informant Patient Reported? Taking? FLUoxetine (PROzac) 20 mg capsule   Yes No   Sig: Take by mouth   Riboflavin 400 MG TABS   Yes No   Sig: Take 400 mg by mouth   acetaminophen (TYLENOL) 650 mg CR tablet   Yes No   Sig: Take 650 mg by mouth   albuterol (Ventolin HFA) 90 mcg/act inhaler   Yes No   Sig: Inhale 2 puffs   ciprofloxacin (CIPRO) 250 mg tablet   Yes No   Sig: take 1 tablet by mouth every 12 hours for 3 days   cyclobenzaprine (FLEXERIL) 5 mg tablet   Yes No   Sig: Take 5 mg by mouth   diclofenac sodium (VOLTAREN) 1 %   Yes No   Sig: Place 2 g on the skin   fluconazole (DIFLUCAN) 150 mg tablet   Yes No   Sig: TAKE ONE TABLET BY MOUTH once for 1 dose   ibuprofen (MOTRIN) 600 mg tablet   Yes No   Sig: TAKE ONE TABLET BY MOUTH EVERY 8 HOURS AS NEEDED FOR PAIN WITH FOOD   levocetirizine (XYZAL) 5 MG tablet   Yes No   Sig: Take 5 mg by mouth   lidocaine (LIDODERM) 5 %   Yes No   Sig: PLACE ONE PATCH TRANSDERMALLY DAILY FOR 7 DAYS   REMOVE & DISCARD PATCH WITHIN 12 HOURS OR AS DIRECTED   meloxicam (MOBIC) 7 5 mg tablet   Yes No   Sig: Take 7 5 mg by mouth   montelukast (SINGULAIR) 10 mg tablet   Yes No   Sig: TAKE ONE TABLET BY MOUTH EVERY DAY olopatadine HCl (PATADAY) 0 2 % opth drops   Yes No   Sig: INSTILL ONE DROP IN EACH EYE TWICE DAILY   prednisoLONE acetate (PRED FORTE) 1 % ophthalmic suspension   Yes No   Sig: INSTILL ONE DROP IN THE RIGHT EYE FOUR TIMES DAILY FOR ONE WEEK AFTER procedure, keep bottle for other eye   sertraline (ZOLOFT) 25 mg tablet   Yes No   Sig: Take 25 mg by mouth   simvastatin (ZOCOR) 10 mg tablet   Yes No   Sig: Take 10 mg by mouth      Facility-Administered Medications: None       Past Medical History:   Diagnosis Date   • Chronic kidney disease    • Depression    • H/O tubal ligation 1995   • Osteoarthritis    • S/P bunionectomy 2010       Past Surgical History:   Procedure Laterality Date   • ANKLE SURGERY  2010    Bone cyst, left ankle   • FIRST METATARSAL OSTEOTOMY  2010   • FOOT SURGERY     • HYSTERECTOMY  2012   • TUBAL LIGATION  1995       History reviewed  No pertinent family history  I have reviewed and agree with the history as documented  E-Cigarette/Vaping   • E-Cigarette Use Never User      E-Cigarette/Vaping Substances   • Nicotine No    • THC No    • CBD No    • Flavoring No    • Other No    • Unknown No      Social History     Tobacco Use   • Smoking status: Never   • Smokeless tobacco: Never   Vaping Use   • Vaping Use: Never used   Substance Use Topics   • Alcohol use: Never   • Drug use: Never       Review of Systems   Constitutional: Negative for activity change, appetite change, chills and fever  HENT: Negative for congestion, rhinorrhea and sore throat  Eyes: Negative for visual disturbance  Respiratory: Negative for chest tightness, shortness of breath and wheezing  Cardiovascular: Negative for chest pain, palpitations and leg swelling  Gastrointestinal: Positive for abdominal pain and diarrhea  Negative for constipation, nausea and vomiting  Genitourinary: Negative for dysuria, flank pain and pelvic pain  Musculoskeletal: Negative for back pain and neck pain     Skin: Negative for rash  Neurological: Negative for weakness, numbness and headaches  Psychiatric/Behavioral: Negative for behavioral problems  Physical Exam  Physical Exam  Vitals and nursing note reviewed  Constitutional:       General: She is not in acute distress  Appearance: She is well-developed  She is obese  She is not diaphoretic  HENT:      Head: Normocephalic and atraumatic  Right Ear: External ear normal       Left Ear: External ear normal       Nose: Nose normal    Eyes:      Pupils: Pupils are equal, round, and reactive to light  Cardiovascular:      Rate and Rhythm: Normal rate and regular rhythm  Heart sounds: Normal heart sounds  Pulmonary:      Effort: Pulmonary effort is normal  No respiratory distress  Breath sounds: Normal breath sounds  No wheezing or rales  Abdominal:      General: Bowel sounds are normal       Palpations: Abdomen is soft  Tenderness: There is abdominal tenderness in the right lower quadrant  Comments: Negative Guillen's sign   Negative McBurney's sign   Negative Obturator sign   Negative Psoas sign   Negative Rovsing sign  No CVA TTP  Exam limited by body habitus   Musculoskeletal:         General: No tenderness or deformity  Normal range of motion  Cervical back: Normal range of motion and neck supple  Skin:     General: Skin is warm and dry  Capillary Refill: Capillary refill takes less than 2 seconds  Neurological:      Mental Status: She is alert and oriented to person, place, and time  Motor: No abnormal muscle tone           Vital Signs  ED Triage Vitals [01/07/23 1524]   Temperature Pulse Respirations Blood Pressure SpO2   97 9 °F (36 6 °C) 93 16 (!) 178/91 99 %      Temp Source Heart Rate Source Patient Position - Orthostatic VS BP Location FiO2 (%)   Temporal Monitor Sitting Left arm --      Pain Score       5           Vitals:    01/07/23 1524 01/07/23 1834   BP: (!) 178/91 111/73   Pulse: 93 63   Patient Position - Orthostatic VS: Sitting          Visual Acuity      ED Medications  Medications   sodium chloride 0 9 % bolus 1,000 mL (0 mL Intravenous Stopped 1/7/23 1751)   acetaminophen (TYLENOL) tablet 650 mg (650 mg Oral Given 1/7/23 1655)   hydrocortisone (Solu-CORTEF) injection 100 mg (100 mg Intravenous Given 1/7/23 1657)   diphenhydrAMINE (BENADRYL) injection 25 mg (25 mg Intravenous Given 1/7/23 1656)   iohexol (OMNIPAQUE) 350 MG/ML injection (SINGLE-DOSE) 100 mL (100 mL Intravenous Given 1/7/23 1803)   sodium chloride 0 9 % bolus 1,000 mL (1,000 mL Intravenous New Bag 1/7/23 1827)       Diagnostic Studies  Results Reviewed     Procedure Component Value Units Date/Time    Urinalysis with microscopic [[id number]]  (Abnormal) Collected: 01/07/23 1922    Lab Status: Final result Specimen: Urine, Clean Catch Updated: 01/07/23 1938     Color, UA Straw     Clarity, UA Clear     Specific Gravity, UA <=1 005     pH, UA 5 5     Leukocytes, UA Negative     Nitrite, UA Negative     Protein, UA Negative mg/dl      Glucose, UA Negative mg/dl      Ketones, UA Negative mg/dl      Urobilinogen, UA 0 2 E U /dl      Bilirubin, UA Negative     Occult Blood, UA Trace-lysed     RBC, UA 0-1 /hpf      WBC, UA None Seen /hpf      Epithelial Cells Occasional /hpf      Bacteria, UA None Seen /hpf     FLU/RSV/COVID - if FLU/RSV clinically relevant [[id number]]  (Normal) Collected: 01/07/23 1552    Lab Status: Final result Specimen: Nares from Nasopharyngeal Swab Updated: 01/07/23 1637     SARS-CoV-2 Negative     INFLUENZA A PCR Negative     INFLUENZA B PCR Negative     RSV PCR Negative    Narrative:      FOR PEDIATRIC PATIENTS - copy/paste COVID Guidelines URL to browser: [email or web address]    SARS-CoV-2 assay is a Nucleic Acid Amplification assay intended for the  qualitative detection of nucleic acid from SARS-CoV-2 in nasopharyngeal  swabs   Results are for the presumptive identification of SARS-CoV-2 RNA  Positive results are indicative of infection with SARS-CoV-2, the virus  causing COVID-19, but do not rule out bacterial infection or co-infection  with other viruses  Laboratories within the [place] and its  territories are required to report all positive results to the appropriate  public health authorities  Negative results do not preclude SARS-CoV-2  infection and should not be used as the sole basis for treatment or other  patient management decisions  Negative results must be combined with  clinical observations, patient history, and epidemiological information  This test has not been FDA cleared or approved  This test has been authorized by FDA under an Emergency Use Authorization  (EUA)  This test is only authorized for the duration of time the  declaration that circumstances exist justifying the authorization of the  emergency use of an in vitro diagnostic tests for detection of SARS-CoV-2  virus and/or diagnosis of COVID-19 infection under section 564(b)(1) of  the Act, 21 U  S C  872OLE-5(X)(0), unless the authorization is terminated  or revoked sooner  The test has been validated but independent review by FDA  and CLIA is pending  Test performed using [employer] GeneXpert: This RT-PCR assay targets N2,  a region unique to SARS-CoV-2  A conserved region in the E-gene was chosen  for pan-Sarbecovirus detection which includes SARS-CoV-2  According to CMS-2020-01-R, this platform meets the definition of high-throughput technology      Comprehensive metabolic panel [[id number]]  (Abnormal) Collected: 01/07/23 1553    Lab Status: Final result Specimen: Blood from Arm, Right Updated: 01/07/23 1630     Sodium 138 mmol/L      Potassium 3 5 mmol/L      Chloride 103 mmol/L      CO2 28 mmol/L      ANION GAP 7 mmol/L      BUN 10 mg/dL      Creatinine 0 89 mg/dL      Glucose 92 mg/dL      Calcium 8 7 mg/dL      Corrected Calcium 9 3 mg/dL      AST 42 U/L      ALT 46 U/L      Alkaline Phosphatase 95 U/L      Total Protein 7 2 g/dL      Albumin 3 3 g/dL      Total Bilirubin 0 49 mg/dL      eGFR 75 ml/min/1 73sq m     Narrative:      [place] guidelines for Chronic Kidney Disease (CKD):   •  Stage 1 with normal or high GFR (GFR > 90 mL/min/1 73 square meters)  •  Stage 2 Mild CKD (GFR = 60-89 mL/min/1 73 square meters)  •  Stage 3A Moderate CKD (GFR = 45-59 mL/min/1 73 square meters)  •  Stage 3B Moderate CKD (GFR = 30-44 mL/min/1 73 square meters)  •  Stage 4 Severe CKD (GFR = 15-29 mL/min/1 73 square meters)  •  Stage 5 End Stage CKD (GFR <15 mL/min/1 73 square meters)  Note: GFR calculation is accurate only with a steady state creatinine    Lipase [[id number]]  (Normal) Collected: 01/07/23 1553    Lab Status: Final result Specimen: Blood from Arm, Right Updated: 01/07/23 1630     Lipase 109 u/L     Magnesium [[id number]]  (Normal) Collected: 01/07/23 1553    Lab Status: Final result Specimen: Blood from Arm, Right Updated: 01/07/23 1630     Magnesium 2 0 mg/dL     Phosphorus [[id number]]  (Normal) Collected: 01/07/23 1553    Lab Status: Final result Specimen: Blood from Arm, Right Updated: 01/07/23 1630     Phosphorus 3 6 mg/dL     Lactic acid, plasma [[id number]]  (Normal) Collected: 01/07/23 1552    Lab Status: Final result Specimen: Blood from Arm, Right Updated: 01/07/23 1621     LACTIC ACID 0 9 mmol/L     Narrative:      Result may be elevated if tourniquet was used during collection      CBC and differential [[id number]] Collected: 01/07/23 1552    Lab Status: Final result Specimen: Blood from Arm, Right Updated: 01/07/23 1558     WBC 7 43 Thousand/uL      RBC 4 67 Million/uL      Hemoglobin 13 6 g/dL      Hematocrit 41 4 %      MCV 89 fL      MCH 29 1 pg      MCHC 32 9 g/dL      RDW 13 2 %      MPV 10 2 fL      Platelets 496 Thousands/uL      nRBC 0 /100 WBCs      Neutrophils Relative 61 %      Immat GRANS % 0 %      Lymphocytes Relative 30 %      Monocytes Relative 7 %      Eosinophils Relative 2 %      Basophils Relative 0 %      Neutrophils Absolute 4 50 Thousands/µL      Immature Grans Absolute 0 03 Thousand/uL      Lymphocytes Absolute 2 19 Thousands/µL      Monocytes Absolute 0 52 Thousand/µL      Eosinophils Absolute 0 16 Thousand/µL      Basophils Absolute 0 03 Thousands/µL                  CT abdomen pelvis with contrast   Final Result by Juan F Padgett MD (01/07 1904)      1  Underdistended urinary bladder with mild diffuse wall thickening  Correlate with urinalysis  2   Otherwise, no acute abnormality in the abdomen or pelvis  3   Hepatic steatosis  Workstation performed: [id number]         US pelvis complete w transvaginal   Final Result by Juan F Padgett MD (01/07 1649)       Supracervical hysterectomy with 2 hypoechoic foci in the residual cervix measuring up to 9 mm, likely a complex nabothian cysts  Ovaries not visualized           Workstation performed: [id number]                    Procedures  Procedures         ED Course          RESULTS:  Results Reviewed     Procedure Component Value Units Date/Time    Urinalysis with microscopic [[id number]]  (Abnormal) Collected: 01/07/23 1922    Lab Status: Final result Specimen: Urine, Clean Catch Updated: 01/07/23 1938     Color, UA Straw     Clarity, UA Clear     Specific Gravity, UA <=1 005     pH, UA 5 5     Leukocytes, UA Negative     Nitrite, UA Negative     Protein, UA Negative mg/dl      Glucose, UA Negative mg/dl      Ketones, UA Negative mg/dl      Urobilinogen, UA 0 2 E U /dl      Bilirubin, UA Negative     Occult Blood, UA Trace-lysed     RBC, UA 0-1 /hpf      WBC, UA None Seen /hpf      Epithelial Cells Occasional /hpf      Bacteria, UA None Seen /hpf     FLU/RSV/COVID - if FLU/RSV clinically relevant [[id number]]  (Normal) Collected: 01/07/23 1552    Lab Status: Final result Specimen: Nares from Nasopharyngeal Swab Updated: 01/07/23 1637     SARS-CoV-2 Negative     INFLUENZA A PCR Negative     INFLUENZA B PCR Negative     RSV PCR Negative    Narrative:      FOR PEDIATRIC PATIENTS - copy/paste COVID Guidelines URL to browser: [email or web address]    SARS-CoV-2 assay is a Nucleic Acid Amplification assay intended for the  qualitative detection of nucleic acid from SARS-CoV-2 in nasopharyngeal  swabs  Results are for the presumptive identification of SARS-CoV-2 RNA  Positive results are indicative of infection with SARS-CoV-2, the virus  causing COVID-19, but do not rule out bacterial infection or co-infection  with other viruses  Laboratories within the [place] and its  territories are required to report all positive results to the appropriate  public health authorities  Negative results do not preclude SARS-CoV-2  infection and should not be used as the sole basis for treatment or other  patient management decisions  Negative results must be combined with  clinical observations, patient history, and epidemiological information  This test has not been FDA cleared or approved  This test has been authorized by FDA under an Emergency Use Authorization  (EUA)  This test is only authorized for the duration of time the  declaration that circumstances exist justifying the authorization of the  emergency use of an in vitro diagnostic tests for detection of SARS-CoV-2  virus and/or diagnosis of COVID-19 infection under section 564(b)(1) of  the Act, 21 U  [place] C  150AOL-2(P)(7), unless the authorization is terminated  or revoked sooner  The test has been validated but independent review by FDA  and CLIA is pending  Test performed using [email or web address] GeneXpert: This RT-PCR assay targets N2,  a region unique to SARS-CoV-2  A conserved region in the E-gene was chosen  for pan-Sarbecovirus detection which includes SARS-CoV-2  According to CMS-2020-01-R, this platform meets the definition of high-SCADA Access technology      Comprehensive metabolic panel [[id number]]  (Abnormal) Collected: 01/07/23 1553    Lab Status: Final result Specimen: Blood from Arm, Right Updated: 01/07/23 1630     Sodium 138 mmol/L      Potassium 3 5 mmol/L      Chloride 103 mmol/L      CO2 28 mmol/L      ANION GAP 7 mmol/L      BUN 10 mg/dL      Creatinine 0 89 mg/dL      Glucose 92 mg/dL      Calcium 8 7 mg/dL      Corrected Calcium 9 3 mg/dL      AST 42 U/L      ALT 46 U/L      Alkaline Phosphatase 95 U/L      Total Protein 7 2 g/dL      Albumin 3 3 g/dL      Total Bilirubin 0 49 mg/dL      eGFR 75 ml/min/1 73sq m     Narrative:      [place] guidelines for Chronic Kidney Disease (CKD):   •  Stage 1 with normal or high GFR (GFR > 90 mL/min/1 73 square meters)  •  Stage 2 Mild CKD (GFR = 60-89 mL/min/1 73 square meters)  •  Stage 3A Moderate CKD (GFR = 45-59 mL/min/1 73 square meters)  •  Stage 3B Moderate CKD (GFR = 30-44 mL/min/1 73 square meters)  •  Stage 4 Severe CKD (GFR = 15-29 mL/min/1 73 square meters)  •  Stage 5 End Stage CKD (GFR <15 mL/min/1 73 square meters)  Note: GFR calculation is accurate only with a steady state creatinine    Lipase [[id number]]  (Normal) Collected: 01/07/23 1553    Lab Status: Final result Specimen: Blood from Arm, Right Updated: 01/07/23 1630     Lipase 109 u/L     Magnesium [[id number]]  (Normal) Collected: 01/07/23 1553    Lab Status: Final result Specimen: Blood from Arm, Right Updated: 01/07/23 1630     Magnesium 2 0 mg/dL     Phosphorus [[id number]]  (Normal) Collected: 01/07/23 1553    Lab Status: Final result Specimen: Blood from Arm, Right Updated: 01/07/23 1630     Phosphorus 3 6 mg/dL     Lactic acid, plasma [[id number]]  (Normal) Collected: 01/07/23 1552    Lab Status: Final result Specimen: Blood from Arm, Right Updated: 01/07/23 1621     LACTIC ACID 0 9 mmol/L     Narrative:      Result may be elevated if tourniquet was used during collection      CBC and differential [[id number]] Collected: 01/07/23 1552    Lab Status: Final result Specimen: Blood from Arm, Right Updated: 01/07/23 1558     WBC 7 43 Thousand/uL      RBC 4 67 Million/uL      Hemoglobin 13 6 g/dL      Hematocrit 41 4 %      MCV 89 fL      MCH 29 1 pg      MCHC 32 9 g/dL      RDW 13 2 %      MPV 10 2 fL      Platelets 520 Thousands/uL      nRBC 0 /100 WBCs      Neutrophils Relative 61 %      Immat GRANS % 0 %      Lymphocytes Relative 30 %      Monocytes Relative 7 %      Eosinophils Relative 2 %      Basophils Relative 0 %      Neutrophils Absolute 4 50 Thousands/µL      Immature Grans Absolute 0 03 Thousand/uL      Lymphocytes Absolute 2 19 Thousands/µL      Monocytes Absolute 0 52 Thousand/µL      Eosinophils Absolute 0 16 Thousand/µL      Basophils Absolute 0 03 Thousands/µL           CT abdomen pelvis with contrast   Final Result      1  Underdistended urinary bladder with mild diffuse wall thickening  Correlate with urinalysis  2   Otherwise, no acute abnormality in the abdomen or pelvis  3   Hepatic steatosis  Workstation performed: [id number]         US pelvis complete w transvaginal   Final Result       Supracervical hysterectomy with 2 hypoechoic foci in the residual cervix measuring up to 9 mm, likely a complex nabothian cysts  Ovaries not visualized  Workstation performed: [id number]             Vitals:    01/07/23 1524 01/07/23 1834   BP: (!) 178/91 111/73   TempSrc: Temporal    Pulse: 93 63   Resp: 16 18   Patient Position - Orthostatic VS: Sitting    Temp: 97 9 °F (36 6 °C)            Medical Decision Making  29-year-old female with past medical history pertinent for CKD, next respiratory status to ligation, osteoarthritis who presents to the emergency department for right lower quadrant abdominal pain that started on Sunday with diarrhea  Vital signs on arrival here notable for blood pressures in 170s over 90s  Otherwise vital signs are nonconcerning    Patient had exam as above with noted tenderness in the right lower quadrant  CT imaging ordered along with lab work and urinalysis to evaluate further  Transvaginal ultrasound ordered as well given patient's reported history of ovarian cyst   Patient was given Tylenol and IV fluids for supportive care with improvement on reassessment  Urine and viral panel obtained as well  Results of lab work returned showing no significant lab abnormalities noted with no leukocytosis and normal lactic acid  Urine results showed no obvious UTI  CT imaging showed under distended urinary bladder with mild diffuse wall thickening with recommendation to correlate with UA  No UTI noted  Hepatic steatosis noted but otherwise no acute abnormalities  Ultrasound returned showing 2 hypoechoic foci in the residual cervix measuring 9 mm, likely complex nabothian cysts  Unable to visualize ovaries  Viral panel returned negative  Patient was advised outpatient follow-up with OB/GYN  for the nabothian cysts  Otherwise advised using over-the-counter Tylenol for symptoms  May return for worsening symptoms  Abdominal pain, unspecified abdominal location: acute illness or injury  Nabothian cyst: acute illness or injury  Amount and/or Complexity of Data Reviewed  Labs: ordered  Decision-making details documented in ED Course  Radiology: ordered and independent interpretation performed  Decision-making details documented in ED Course  Risk  OTC drugs  Prescription drug management            Disposition  Final diagnoses:   Abdominal pain, unspecified abdominal location   Nabothian cyst     Time reflects when diagnosis was documented in both MDM as applicable and the Disposition within this note     Time User Action Codes Description Comment    1/7/2023  6:28 PM Staci DYE Add [R10 9] Abdominal pain, unspecified abdominal location     1/7/2023  6:29 PM Lexa Christian Add [N88 8] Nabothian cyst       ED Disposition     ED Disposition   Discharge    Condition   Stable    Date/Time   Sat Jan 7, 2023  6:28 PM    Comment   [facility] discharge to home/self care                 Follow-up Information     Follow up With Specialties Details Why Contact Info    OBGYN              Patient's Medications   Discharge Prescriptions    No medications on file           PDMP Review     None          ED Provider  Electronically Signed by           Alex Forbes MD  01/07/23 1940

## 2023-01-07 NOTE — DISCHARGE INSTRUCTIONS
Thank you for letting us take care of you  You have been evaluated for abdominal pain  Please follow-up with OB/GYN regarding your nabothian cyst as discussed  Please follow-up with your primary care physician  Please return for worsening symptoms  At this time, you have no clinical evidence of symptoms or problems that will require hospitalization, however you should be evaluated soon by a primary care physician, and contact information has been provided  Follow up with your primary care physician  This is important as many medical conditions can be managed as an outpatient, in addition to routine health screening  Seeing your primary doctor often can help identify changes in the medical issue that brought you to the ED for care today  If you experience any new symptoms or acute worsening of current symptoms, please return to the ED

## 2023-09-13 ENCOUNTER — DOCTOR'S OFFICE (OUTPATIENT)
Dept: URBAN - NONMETROPOLITAN AREA CLINIC 1 | Facility: CLINIC | Age: 53
Setting detail: OPHTHALMOLOGY
End: 2023-09-13
Payer: COMMERCIAL

## 2023-09-13 ENCOUNTER — RX ONLY (RX ONLY)
Age: 53
End: 2023-09-13

## 2023-09-13 VITALS — HEIGHT: 55 IN

## 2023-09-13 DIAGNOSIS — H35.372: ICD-10-CM

## 2023-09-13 DIAGNOSIS — H40.033: ICD-10-CM

## 2023-09-13 DIAGNOSIS — H10.13: ICD-10-CM

## 2023-09-13 DIAGNOSIS — H25.13: ICD-10-CM

## 2023-09-13 DIAGNOSIS — H04.123: ICD-10-CM

## 2023-09-13 DIAGNOSIS — H43.393: ICD-10-CM

## 2023-09-13 PROCEDURE — 99213 OFFICE O/P EST LOW 20 MIN: CPT | Performed by: OPHTHALMOLOGY

## 2023-09-13 PROCEDURE — 92134 CPTRZ OPH DX IMG PST SGM RTA: CPT | Performed by: OPHTHALMOLOGY

## 2023-09-13 ASSESSMENT — REFRACTION_MANIFEST
OD_AXIS: 085
OS_ADD: +2.25
OU_VA: 20/20
OS_SPHERE: +0.25
OD_SPHERE: +0.50
OD_CYLINDER: -0.50
OS_CYLINDER: -0.25
OD_ADD: +2.25
OS_VA2: 20/25
OS_VA1: 20/20
OD_VA2: 20/25
OS_AXIS: 120
OD_VA1: 20/20

## 2023-09-13 ASSESSMENT — REFRACTION_AUTOREFRACTION
OD_SPHERE: +0.50
OD_CYLINDER: -0.75
OS_AXIS: 84
OS_CYLINDER: -0.50
OS_SPHERE: +0.25
OD_AXIS: 89

## 2023-09-13 ASSESSMENT — VISUAL ACUITY
OD_BCVA: 20/15
OS_BCVA: 20/20

## 2023-09-13 ASSESSMENT — SUPERFICIAL PUNCTATE KERATITIS (SPK)
OS_SPK: ABSENT
OD_SPK: ABSENT

## 2023-09-13 ASSESSMENT — AXIALLENGTH_DERIVED
OD_AL: 23.8209
OD_AL: 23.7713
OS_AL: 23.6841
OS_AL: 23.6351

## 2023-09-13 ASSESSMENT — SPHEQUIV_DERIVED
OD_SPHEQUIV: 0.125
OS_SPHEQUIV: 0
OS_SPHEQUIV: 0.125
OD_SPHEQUIV: 0.25

## 2023-09-13 ASSESSMENT — KERATOMETRY
OD_K1POWER_DIOPTERS: 42.50
OS_AXISANGLE_DEGREES: 6
OS_K2POWER_DIOPTERS: 43.50
OS_K1POWER_DIOPTERS: 43.00
OD_K2POWER_DIOPTERS: 43.00
OD_AXISANGLE_DEGREES: 175

## 2023-09-13 ASSESSMENT — CONFRONTATIONAL VISUAL FIELD TEST (CVF)
OS_FINDINGS: FULL
OD_FINDINGS: FULL

## 2023-09-13 ASSESSMENT — REFRACTION_CURRENTRX
OD_SPHERE: +2.50
OS_SPHERE: +2.50
OS_OVR_VA: 20/
OD_OVR_VA: 20/

## 2023-11-05 ENCOUNTER — OFFICE VISIT (OUTPATIENT)
Dept: URGENT CARE | Facility: CLINIC | Age: 53
End: 2023-11-05
Payer: COMMERCIAL

## 2023-11-05 VITALS
TEMPERATURE: 98.7 F | HEIGHT: 65 IN | HEART RATE: 100 BPM | DIASTOLIC BLOOD PRESSURE: 84 MMHG | BODY MASS INDEX: 43.32 KG/M2 | OXYGEN SATURATION: 97 % | RESPIRATION RATE: 22 BRPM | SYSTOLIC BLOOD PRESSURE: 144 MMHG | WEIGHT: 260 LBS

## 2023-11-05 DIAGNOSIS — J32.9 SINOBRONCHITIS: Primary | ICD-10-CM

## 2023-11-05 DIAGNOSIS — J40 SINOBRONCHITIS: Primary | ICD-10-CM

## 2023-11-05 PROCEDURE — 99213 OFFICE O/P EST LOW 20 MIN: CPT

## 2023-11-05 PROCEDURE — S9088 SERVICES PROVIDED IN URGENT: HCPCS

## 2023-11-05 RX ORDER — AMOXICILLIN AND CLAVULANATE POTASSIUM 875; 125 MG/1; MG/1
1 TABLET, FILM COATED ORAL EVERY 12 HOURS SCHEDULED
Qty: 14 TABLET | Refills: 0 | Status: SHIPPED | OUTPATIENT
Start: 2023-11-05 | End: 2023-11-12

## 2023-11-05 RX ORDER — AMOXICILLIN AND CLAVULANATE POTASSIUM 875; 125 MG/1; MG/1
1 TABLET, FILM COATED ORAL EVERY 12 HOURS SCHEDULED
Qty: 14 TABLET | Refills: 0 | Status: SHIPPED | OUTPATIENT
Start: 2023-11-05 | End: 2023-11-05 | Stop reason: SDUPTHER

## 2023-11-05 NOTE — PROGRESS NOTES
[facility]        NAME: Toya Cottrell is a 47 y.o. female  : 1970    MRN: [id number]  DATE: 2023  TIME: 1:22 PM    Assessment and Plan   Sinobronchitis [J32.9, J40]  1. Sinobronchitis  amoxicillin-clavulanate (AUGMENTIN) 875-125 mg per tablet    DISCONTINUED: amoxicillin-clavulanate (AUGMENTIN) 875-125 mg per tablet        Discussed problem with patient. Symptoms consistent with sinobronchitis and prescribing Augmentin for that. Suspicious of asthma component but patient states she has new onset allergy to prednisone. Advised to follow-up with PCP for that issue. Over-the-counter cough suppressants and continue inhaler for as needed wheezing and shortness of breath. Patient Instructions       Follow up with PCP in 3-5 days. Proceed to  ER if symptoms worsen. Chief Complaint     Chief Complaint   Patient presents with   • Cold Like Symptoms     Cough, congestion, bilateral ear pain and sore throat x 2 weeks         History of Present Illness       Cough, congestion, bilateral ear pain and sore throat x 2 weeks        Review of Systems   Review of Systems   Constitutional:  Negative for appetite change, chills, fatigue and fever. HENT:  Positive for congestion, ear pain, postnasal drip, sinus pressure, sinus pain and sore throat. Negative for rhinorrhea. Respiratory:  Positive for cough and wheezing. Negative for shortness of breath and stridor. Cardiovascular:  Negative for chest pain and palpitations. Gastrointestinal:  Negative for abdominal pain, constipation, diarrhea, nausea and vomiting. Musculoskeletal:  Negative for myalgias. Neurological:  Positive for syncope and headaches. Negative for dizziness and light-headedness.          Current Medications       Current Outpatient Medications:   •  acetaminophen (TYLENOL) 650 mg CR tablet, Take 650 mg by mouth, Disp: , Rfl:   •  albuterol (Ventolin HFA) 90 mcg/act inhaler, Inhale 2 puffs, Disp: , Rfl:   • amoxicillin-clavulanate (AUGMENTIN) 875-125 mg per tablet, Take 1 tablet by mouth every 12 (twelve) hours for 7 days, Disp: 14 tablet, Rfl: 0  •  cyclobenzaprine (FLEXERIL) 5 mg tablet, Take 5 mg by mouth, Disp: , Rfl:   •  diclofenac sodium (VOLTAREN) 1 %, Place 2 g on the skin, Disp: , Rfl:   •  FLUoxetine (PROzac) 20 mg capsule, Take by mouth, Disp: , Rfl:   •  levocetirizine (XYZAL) 5 MG tablet, Take 5 mg by mouth, Disp: , Rfl:   •  lidocaine (LIDODERM) 5 %, PLACE ONE PATCH TRANSDERMALLY DAILY FOR 7 DAYS.  REMOVE & DISCARD PATCH WITHIN 12 HOURS OR AS DIRECTED, Disp: , Rfl:   •  montelukast (SINGULAIR) 10 mg tablet, TAKE ONE TABLET BY MOUTH EVERY DAY, Disp: , Rfl:   •  olopatadine HCl (PATADAY) 0.2 % opth drops, INSTILL ONE DROP IN EACH EYE TWICE DAILY, Disp: , Rfl:   •  Riboflavin 400 MG TABS, Take 400 mg by mouth, Disp: , Rfl:   •  simvastatin (ZOCOR) 10 mg tablet, Take 10 mg by mouth, Disp: , Rfl:   •  ciprofloxacin (CIPRO) 250 mg tablet, take 1 tablet by mouth every 12 hours for 3 days, Disp: , Rfl:   •  fluconazole (DIFLUCAN) 150 mg tablet, TAKE ONE TABLET BY MOUTH once for 1 dose, Disp: , Rfl:   •  ibuprofen (MOTRIN) 600 mg tablet, TAKE ONE TABLET BY MOUTH EVERY 8 HOURS AS NEEDED FOR PAIN WITH FOOD, Disp: , Rfl:   •  meloxicam (MOBIC) 7.5 mg tablet, Take 7.5 mg by mouth, Disp: , Rfl:   •  prednisoLONE acetate (PRED FORTE) 1 % ophthalmic suspension, INSTILL ONE DROP IN THE RIGHT EYE FOUR TIMES DAILY FOR ONE WEEK AFTER procedure, keep bottle for other eye, Disp: , Rfl:   •  sertraline (ZOLOFT) 25 mg tablet, Take 25 mg by mouth, Disp: , Rfl:     Current Allergies     Allergies as of 11/05/2023 - Reviewed 11/05/2023   Allergen Reaction Noted   • Bupropion  10/09/2017   • Contrast [iodinated contrast media] Itching 01/07/2023   • Diclofenac  07/06/2011   • Hydrocodone-acetaminophen GI Intolerance and Vomiting 02/05/2009   • Naproxen  10/09/2017   • Oxycodone-acetaminophen  02/15/2020   • Prednisone Other (See Comments) 04/06/2005   • Sulfa antibiotics  05/12/2020   • Sulfamethoxazole-trimethoprim  02/07/2005            The following portions of the patient's history were reviewed and updated as appropriate: allergies, current medications, past family history, past medical history, past social history, past surgical history and problem list.     Past Medical History:   Diagnosis Date   • Asthma    • Depression    • H/O tubal ligation 1995   • High cholesterol    • Osteoarthritis    • S/P bunionectomy 2010       Past Surgical History:   Procedure Laterality Date   • ANKLE SURGERY  2010    Bone cyst, left ankle   • FIRST METATARSAL OSTEOTOMY  2010   • FOOT SURGERY     • HYSTERECTOMY  2012   • TUBAL LIGATION  1995       Family History   Problem Relation Age of Onset   • Heart disease Mother    • Cancer Mother    • Diabetes Father    • Hyperlipidemia Father    • Hypertension Father    • Heart disease Father    • Anuerysm Father          Medications have been verified. Objective   /84   Pulse 100   Temp 98.7 °F (37.1 °C)   Resp 22   Ht 5' 5" (1.651 m)   Wt 118 kg (260 lb)   SpO2 97%   BMI 43.27 kg/m²        Physical Exam     Physical Exam  Vitals and nursing note reviewed. Constitutional:       General: She is not in acute distress. Appearance: Normal appearance. She is normal weight. She is not ill-appearing, toxic-appearing or diaphoretic. HENT:      Head: Normocephalic. Right Ear: Tympanic membrane, ear canal and external ear normal. There is no impacted cerumen. Left Ear: Tympanic membrane, ear canal and external ear normal. There is no impacted cerumen. Nose: Congestion present. No rhinorrhea. Mouth/Throat:      Mouth: Mucous membranes are moist.      Pharynx: Oropharynx is clear. Posterior oropharyngeal erythema present. No oropharyngeal exudate. Eyes:      General:         Right eye: No discharge. Left eye: No discharge.       Extraocular Movements: Extraocular movements intact. Conjunctiva/sclera: Conjunctivae normal.      Pupils: Pupils are equal, round, and reactive to light. Neck:      Vascular: No carotid bruit. Cardiovascular:      Rate and Rhythm: Normal rate and regular rhythm. Pulses: Normal pulses. Heart sounds: Normal heart sounds. No murmur heard. No friction rub. No gallop. Pulmonary:      Effort: Pulmonary effort is normal. No respiratory distress. Breath sounds: Normal breath sounds. No stridor. No wheezing, rhonchi or rales. Chest:      Chest wall: No tenderness. Musculoskeletal:      Cervical back: Normal range of motion and neck supple. No rigidity or tenderness. Lymphadenopathy:      Cervical: No cervical adenopathy. Neurological:      Mental Status: She is alert.

## 2024-09-24 ENCOUNTER — DOCTOR'S OFFICE (OUTPATIENT)
Dept: URBAN - NONMETROPOLITAN AREA CLINIC 1 | Facility: CLINIC | Age: 54
Setting detail: OPHTHALMOLOGY
End: 2024-09-24
Payer: COMMERCIAL

## 2024-09-24 DIAGNOSIS — H25.13: ICD-10-CM

## 2024-09-24 DIAGNOSIS — H40.033: ICD-10-CM

## 2024-09-24 DIAGNOSIS — H35.372: ICD-10-CM

## 2024-09-24 DIAGNOSIS — H43.393: ICD-10-CM

## 2024-09-24 DIAGNOSIS — H35.363: ICD-10-CM

## 2024-09-24 DIAGNOSIS — H04.123: ICD-10-CM

## 2024-09-24 DIAGNOSIS — H10.13: ICD-10-CM

## 2024-09-24 PROCEDURE — 92014 COMPRE OPH EXAM EST PT 1/>: CPT | Performed by: OPHTHALMOLOGY

## 2024-09-24 PROCEDURE — 92134 CPTRZ OPH DX IMG PST SGM RTA: CPT | Performed by: OPHTHALMOLOGY

## 2024-09-24 ASSESSMENT — REFRACTION_MANIFEST
OS_VA1: 20/20
OU_VA: 20/20
OD_VA1: 20/20
OS_SPHERE: +0.25
OS_ADD: +2.25
OS_CYLINDER: -0.25
OS_AXIS: 120
OD_VA2: 20/25
OD_AXIS: 085
OD_SPHERE: +0.50
OD_ADD: +2.25
OD_CYLINDER: -0.50
OS_VA2: 20/25

## 2024-09-24 ASSESSMENT — KERATOMETRY
OS_K1POWER_DIOPTERS: 7.89
OD_AXISANGLE_DEGREES: 018
OS_AXISANGLE_DEGREES: 027
OD_K2POWER_DIOPTERS: 7.77
OD_K1POWER_DIOPTERS: 7.86
OS_K2POWER_DIOPTERS: 7.81

## 2024-09-24 ASSESSMENT — REFRACTION_AUTOREFRACTION
OS_SPHERE: +0.25
OS_AXIS: 099
OD_CYLINDER: -0.50
OS_CYLINDER: -1.00
OD_SPHERE: 0.00
OD_AXIS: 086

## 2024-09-24 ASSESSMENT — REFRACTION_CURRENTRX
OS_AXIS: 119
OD_OVR_VA: 20/
OS_OVR_VA: 20/
OD_CYLINDER: -0.50
OS_VPRISM_DIRECTION: SV
OS_SPHERE: +2.50
OS_CYLINDER: -0.25
OD_SPHERE: +2.75
OD_VPRISM_DIRECTION: SV
OD_AXIS: 083

## 2024-09-24 ASSESSMENT — CONFRONTATIONAL VISUAL FIELD TEST (CVF)
OS_FINDINGS: FULL
OD_FINDINGS: FULL

## 2024-09-24 ASSESSMENT — VISUAL ACUITY
OS_BCVA: 20/20
OD_BCVA: 20/20

## 2024-09-24 ASSESSMENT — SUPERFICIAL PUNCTATE KERATITIS (SPK)
OS_SPK: ABSENT
OD_SPK: ABSENT

## 2024-10-18 ENCOUNTER — OPTICAL OFFICE (OUTPATIENT)
Dept: URBAN - NONMETROPOLITAN AREA CLINIC 4 | Facility: CLINIC | Age: 54
Setting detail: OPHTHALMOLOGY
End: 2024-10-18

## 2024-10-18 ENCOUNTER — DOCTOR'S OFFICE (OUTPATIENT)
Dept: URBAN - NONMETROPOLITAN AREA CLINIC 1 | Facility: CLINIC | Age: 54
Setting detail: OPHTHALMOLOGY
End: 2024-10-18
Payer: COMMERCIAL

## 2024-10-18 VITALS — HEIGHT: 55 IN

## 2024-10-18 DIAGNOSIS — H52.4: ICD-10-CM

## 2024-10-18 PROCEDURE — 92012 INTRM OPH EXAM EST PATIENT: CPT | Performed by: OPTOMETRIST

## 2024-10-18 PROCEDURE — V2750 ANTI-REFLECTIVE COATING: HCPCS | Performed by: OPTOMETRIST

## 2024-10-18 PROCEDURE — V2781 PROGRESSIVE LENS PER LENS: HCPCS | Performed by: OPTOMETRIST

## 2024-10-18 PROCEDURE — V2750 ANTI-REFLECTIVE COATING: HCPCS | Mod: LT | Performed by: OPTOMETRIST

## 2024-10-18 PROCEDURE — V2781 PROGRESSIVE LENS PER LENS: HCPCS | Mod: LT | Performed by: OPTOMETRIST

## 2024-10-18 PROCEDURE — 92015 DETERMINE REFRACTIVE STATE: CPT | Performed by: OPTOMETRIST

## 2024-10-18 PROCEDURE — V2020 VISION SVCS FRAMES PURCHASES: HCPCS | Performed by: OPTOMETRIST

## 2024-10-18 ASSESSMENT — REFRACTION_CURRENTRX
OD_CYLINDER: -0.50
OD_VPRISM_DIRECTION: SV
OS_SPHERE: +2.50
OS_AXIS: 122
OS_CYLINDER: -0.25
OD_AXIS: 085
OS_VPRISM_DIRECTION: SV
OD_OVR_VA: 20/
OS_OVR_VA: 20/
OD_SPHERE: +2.75

## 2024-10-18 ASSESSMENT — SUPERFICIAL PUNCTATE KERATITIS (SPK)
OD_SPK: ABSENT
OS_SPK: ABSENT

## 2024-10-18 ASSESSMENT — KERATOMETRY
OD_AXISANGLE_DEGREES: 018
OS_K1POWER_DIOPTERS: 7.89
OS_K2POWER_DIOPTERS: 7.81
OD_K2POWER_DIOPTERS: 7.77
OS_AXISANGLE_DEGREES: 027
OD_K1POWER_DIOPTERS: 7.86

## 2024-10-18 ASSESSMENT — REFRACTION_AUTOREFRACTION
OS_SPHERE: +0.50
OS_AXIS: 100
OS_CYLINDER: -0.50
OD_AXIS: 091
OD_CYLINDER: -0.75
OD_SPHERE: +1.00

## 2024-10-18 ASSESSMENT — REFRACTION_MANIFEST
OD_SPHERE: +0.75
OD_ADD: +2.25
OS_CYLINDER: -0.50
OS_VA1: 20/20
OS_AXIS: 100
OU_VA: 20/20
OS_ADD: +2.25
OD_CYLINDER: -0.75
OD_VA1: 20/20
OS_SPHERE: +0.50
OD_AXIS: 090
OD_VA2: 20/25
OS_VA2: 20/25

## 2024-10-18 ASSESSMENT — CONFRONTATIONAL VISUAL FIELD TEST (CVF)
OS_FINDINGS: FULL
OD_FINDINGS: FULL

## 2024-10-18 ASSESSMENT — VISUAL ACUITY
OS_BCVA: 20/20-2
OD_BCVA: 20/20-1

## 2024-10-18 ASSESSMENT — TONOMETRY
OS_IOP_MMHG: 18
OD_IOP_MMHG: 18